# Patient Record
Sex: MALE | Race: OTHER | URBAN - METROPOLITAN AREA
[De-identification: names, ages, dates, MRNs, and addresses within clinical notes are randomized per-mention and may not be internally consistent; named-entity substitution may affect disease eponyms.]

---

## 2018-05-09 ENCOUNTER — EMERGENCY (EMERGENCY)
Facility: HOSPITAL | Age: 38
LOS: 0 days | Discharge: HOME | End: 2018-05-09
Attending: STUDENT IN AN ORGANIZED HEALTH CARE EDUCATION/TRAINING PROGRAM
Payer: OTHER MISCELLANEOUS

## 2018-05-09 VITALS
DIASTOLIC BLOOD PRESSURE: 79 MMHG | HEART RATE: 69 BPM | OXYGEN SATURATION: 98 % | SYSTOLIC BLOOD PRESSURE: 127 MMHG | RESPIRATION RATE: 16 BRPM | TEMPERATURE: 98 F

## 2018-05-09 VITALS
HEIGHT: 63 IN | DIASTOLIC BLOOD PRESSURE: 88 MMHG | OXYGEN SATURATION: 98 % | TEMPERATURE: 98 F | RESPIRATION RATE: 17 BRPM | SYSTOLIC BLOOD PRESSURE: 159 MMHG | WEIGHT: 179.9 LBS | HEART RATE: 79 BPM

## 2018-05-09 DIAGNOSIS — T20.40XA CORROSION OF UNSPECIFIED DEGREE OF HEAD, FACE, AND NECK, UNSPECIFIED SITE, INITIAL ENCOUNTER: ICD-10-CM

## 2018-05-09 DIAGNOSIS — H57.11 OCULAR PAIN, RIGHT EYE: ICD-10-CM

## 2018-05-09 DIAGNOSIS — T26.91XA CORROSION OF RIGHT EYE AND ADNEXA, PART UNSPECIFIED, INITIAL ENCOUNTER: ICD-10-CM

## 2018-05-09 DIAGNOSIS — H53.71 GLARE SENSITIVITY: ICD-10-CM

## 2018-05-09 DIAGNOSIS — T65.91XA TOXIC EFFECT OF UNSPECIFIED SUBSTANCE, ACCIDENTAL (UNINTENTIONAL), INITIAL ENCOUNTER: ICD-10-CM
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
## 2018-05-09 PROCEDURE — 99284 EMERGENCY DEPT VISIT MOD MDM: CPT

## 2018-05-09 RX ORDER — TOBRAMYCIN 0.3 %
1 DROPS OPHTHALMIC (EYE)
Qty: 2 | Refills: 0 | OUTPATIENT
Start: 2018-05-09 | End: 2018-05-15

## 2018-05-09 NOTE — ED ADULT NURSE NOTE - OBJECTIVE STATEMENT
" I was working on a car. The antifreeze splashed on my face and chest. I have a lot of pain in my right eye."

## 2018-05-09 NOTE — ED ADULT NURSE REASSESSMENT NOTE - NS ED NURSE REASSESS COMMENT FT1
pt VS STABLE AFEBRILE CALL RAGLAND IN REACH, SAFETY MAINTAINED PT PLACED, bacitracin applied as directed to affected area. pt tolerated well. pt made aware to be discharged.

## 2018-05-09 NOTE — ED PROVIDER NOTE - MEDICAL DECISION MAKING DETAILS
pt presented with a chemical burn of his right eye due to antifreeze while at work, his eye was irrigated in the ambulance with one liter of NS, chelle lens was placed in the ER and irrigated with another 2 1/2 L of NS, flurorescein staining done which showed a chemical burn, ophthalmology consulted, pt sent from the ER to dr cervantes's office, tobramycin placed in his eye and prescribed

## 2018-05-09 NOTE — ED PROVIDER NOTE - PROGRESS NOTE DETAILS
dr cervantes called, states that the patient can be sent over to his office once discharged, pt given his name and number and started on tobramycin

## 2018-05-09 NOTE — ED PROVIDER NOTE - OBJECTIVE STATEMENT
38 year old male presents today c/o right eye irritation, pt works as a  and while at work he states that a pipe broke causing antifreeze to splash in his eye and face 15 minutes prior to coming in, in the ambulance, emt did irrigate the eye with one liter of normal saline, +redness of the right face +irritation of the eye +light sensitivity

## 2018-05-09 NOTE — ED PROVIDER NOTE - SKIN, MLM
Skin normal color for race, warm, dry and intact. +right facial erythema and small area of the right side of the neck

## 2018-05-09 NOTE — ED ADULT NURSE REASSESSMENT NOTE - NS ED NURSE REASSESS COMMENT FT1
received report from LIOR Rasmussen, pt VS STABLE AFEBRILE CALL RAGLAND IN REACH, SAFETY MAINTAINED PT R eye irrigating completed md aware.

## 2025-04-11 NOTE — ED ADULT NURSE NOTE - PLAN OF CARE
Wound care consulted for groin rash, Nystatin powder ordered for patient. Wound care will sign off at this time.                 Ria TSANGN, RN, CWOCN   Position of comfort/Explanation of exam/test